# Patient Record
Sex: MALE | Race: WHITE | NOT HISPANIC OR LATINO | URBAN - METROPOLITAN AREA
[De-identification: names, ages, dates, MRNs, and addresses within clinical notes are randomized per-mention and may not be internally consistent; named-entity substitution may affect disease eponyms.]

---

## 2018-01-11 NOTE — RESULT NOTES
Verified Results  * MRI KNEE RIGHT  WO CONTRAST [id number] 01:24PM Kandi Wei Order Number: [id number]    - Patient Instructions: To schedule this appointment, please contact Central Scheduling at [phone]   Order Number: [id number]    - Patient Instructions: To schedule this appointment, please contact Central Scheduling at [phone]  Test Name Result Flag Reference   MRI KNEE RIGHT  WO CONTRAST (Report)     This is a summary report  The complete report is available in the patient's medical record  If you cannot access the medical record, please contact the sending organization for a detailed fax or copy  MRI RIGHT KNEE     INDICATION: Right knee pain  No meniscal injury  Internal derangement  Status post prior meniscal repair  COMPARISON: None  TECHNIQUE:  MR sequences were obtained of the right knee including: Localizer, axial T2 fat sat, coronal T1/T2 fat sat, sagittal PD/T2 fat sat  Images were acquired on a 1 5 Desi unit  Gadolinium was not used  FINDINGS:     SUBCUTANEOUS TISSUES: Normal     JOINT EFFUSION: Small effusion evident  BAKER'S CYST: None  MENISCI: Diminutive appearance to the posterior horn medial meniscus after previous partial meniscectomy  The dorsal root appears fatty-replaced  No evidence of residual or recurrent tear  The lateral meniscus remains intact  CRUCIATE LIGAMENTS: Intact  EXTENSOR APPARATUS: Intact  COLLATERAL LIGAMENTS: Intact  ARTICULAR SURFACES: Intact  BONE MARROW: Normal signal without fracture  MUSCULATURE: Intact  IMPRESSION:   1  Status post partial medial meniscectomy without residual or recurrent tear identified  2  Small joint effusion         Workstation performed: [id number]     Signed by:   Jareth Trujillo MD   9/22/16

## 2022-10-31 ENCOUNTER — VBI (OUTPATIENT)
Dept: ADMINISTRATIVE | Facility: OTHER | Age: 57
End: 2022-10-31

## 2023-02-21 ENCOUNTER — VBI (OUTPATIENT)
Dept: ADMINISTRATIVE | Facility: OTHER | Age: 58
End: 2023-02-21

## 2023-05-16 ENCOUNTER — VBI (OUTPATIENT)
Dept: ADMINISTRATIVE | Facility: OTHER | Age: 58
End: 2023-05-16

## 2025-02-06 ENCOUNTER — HOSPITAL ENCOUNTER (EMERGENCY)
Facility: HOSPITAL | Age: 60
Discharge: HOME | End: 2025-02-06
Attending: EMERGENCY MEDICINE
Payer: OTHER MISCELLANEOUS

## 2025-02-06 VITALS
SYSTOLIC BLOOD PRESSURE: 159 MMHG | RESPIRATION RATE: 18 BRPM | HEART RATE: 80 BPM | TEMPERATURE: 97.3 F | DIASTOLIC BLOOD PRESSURE: 98 MMHG | OXYGEN SATURATION: 95 %

## 2025-02-06 DIAGNOSIS — Z77.098 CHEMICAL EXPOSURE OF EYE: Primary | ICD-10-CM

## 2025-02-06 DIAGNOSIS — Z23 NEED FOR DIPHTHERIA-TETANUS-PERTUSSIS (TDAP) VACCINE: ICD-10-CM

## 2025-02-06 PROCEDURE — 90715 TDAP VACCINE 7 YRS/> IM: CPT | Performed by: PHYSICIAN ASSISTANT

## 2025-02-06 PROCEDURE — 99283 EMERGENCY DEPT VISIT LOW MDM: CPT

## 2025-02-06 PROCEDURE — 99284 EMERGENCY DEPT VISIT MOD MDM: CPT | Performed by: PHYSICIAN ASSISTANT

## 2025-02-06 PROCEDURE — 90471 IMMUNIZATION ADMIN: CPT

## 2025-02-06 RX ORDER — ERYTHROMYCIN 5 MG/G
0.5 OINTMENT OPHTHALMIC ONCE
Status: COMPLETED | OUTPATIENT
Start: 2025-02-06 | End: 2025-02-06

## 2025-02-06 RX ORDER — TETRACAINE HYDROCHLORIDE 5 MG/ML
1 SOLUTION OPHTHALMIC ONCE
Status: COMPLETED | OUTPATIENT
Start: 2025-02-06 | End: 2025-02-06

## 2025-02-06 RX ORDER — POLYVINYL ALCOHOL 14 MG/ML
1 SOLUTION/ DROPS OPHTHALMIC AS NEEDED
Qty: 15 ML | Refills: 0 | Status: SHIPPED | OUTPATIENT
Start: 2025-02-06

## 2025-02-06 RX ORDER — ERYTHROMYCIN 5 MG/G
OINTMENT OPHTHALMIC EVERY 6 HOURS SCHEDULED
Qty: 3.5 G | Refills: 0 | Status: SHIPPED | OUTPATIENT
Start: 2025-02-06

## 2025-02-06 RX ORDER — IBUPROFEN 600 MG/1
600 TABLET, FILM COATED ORAL ONCE
Status: COMPLETED | OUTPATIENT
Start: 2025-02-06 | End: 2025-02-06

## 2025-02-06 RX ADMIN — TETANUS TOXOID, REDUCED DIPHTHERIA TOXOID AND ACELLULAR PERTUSSIS VACCINE, ADSORBED 0.5 ML: 5; 2.5; 8; 8; 2.5 SUSPENSION INTRAMUSCULAR at 17:14

## 2025-02-06 RX ADMIN — FLUORESCEIN SODIUM 2 STRIP: 1 STRIP OPHTHALMIC at 16:38

## 2025-02-06 RX ADMIN — IBUPROFEN 600 MG: 600 TABLET, FILM COATED ORAL at 17:14

## 2025-02-06 RX ADMIN — ERYTHROMYCIN 0.5 INCH: 5 OINTMENT OPHTHALMIC at 17:15

## 2025-02-06 RX ADMIN — TETRACAINE HYDROCHLORIDE 1 DROP: 5 SOLUTION OPHTHALMIC at 16:39

## 2025-02-06 NOTE — Clinical Note
Carson Mathis was seen and treated in our emergency department on 2/6/2025.    Occupational Medicine Follow Up Within 24 hours    Other - See Comments        Diagnosis:     Carson  .    He may return on this date:     Please excuse from work until cleared by eye doctor.     If you have any questions or concerns, please don't hesitate to call.      Svetlana Livingston PA-C    ______________________________           _______________          _______________  Hospital Representative                              Date                                Time

## 2025-02-06 NOTE — ED PROVIDER NOTES
Time reflects when diagnosis was documented in both MDM as applicable and the Disposition within this note       Time User Action Codes Description Comment    2/6/2025  5:21 PM Svetlana Livingston Add [Z77.098] Chemical exposure of eye     2/6/2025  5:25 PM Svetlana Livingston Add [Z23] Need for diphtheria-tetanus-pertussis (Tdap) vaccine           ED Disposition       ED Disposition   Discharge    Condition   Stable    Date/Time   Thu Feb 6, 2025  5:21 PM    Comment   Carson Mathis discharge to home/self care.                   Assessment & Plan       Medical Decision Making  58 yo male presenting for evaluation of irritation to eyes after chemical exposure.  Prior records reviewed.  Safety material data sheet for the chemical reviewed.  Pt has already irrigated.  Will check pH of the eyes.  Will provide tetracaine drops and perform fluorescein stain.  Will update tetanus as pt is unsure of his last one.    pH was obtained from bilateral eyes and 7 bilaterally (neutral).  After tetracaine administered, fluorescein was performed bilaterally with no appreciated corneal abnormalities.  No noted abrasion or ulceration.  No FB.  Pt tolerated well.  Discussed with on call ophthalmology who gave recommendation for artificial tears and can see tomorrow morning in the office.    Reviewed symptomatic management.  OTC meds reviewed.  Anticipatory guidance.  Advised recheck with PCP or return to ER as needed.  Strict return precautions outlined.  Patient voiced understanding and had no further questions.    Please refer to above ER course for further details/discussion.      Problems Addressed:  Chemical exposure of eye: acute illness or injury  Need for diphtheria-tetanus-pertussis (Tdap) vaccine: self-limited or minor problem    Amount and/or Complexity of Data Reviewed  External Data Reviewed: notes.    Risk  OTC drugs.  Prescription drug management.        ED Course as of 02/06/25 1806   Thu Feb 06, 2025   1650 Spoke with  ophthalmology on call.  Recommends artificial tears and can see tomorrow morning at [facility].       Medications   tetracaine 0.5 % ophthalmic solution 1 drop (1 drop Both Eyes Given 2/6/25 1639)   fluorescein sodium sterile ophthalmic strip 2 strip (2 strips Both Eyes Given 2/6/25 1638)   tetanus-diphtheria-acellular pertussis (BOOSTRIX) IM injection 0.5 mL (0.5 mL Intramuscular Given 2/6/25 1714)   erythromycin (ILOTYCIN) 0.5 % ophthalmic ointment 0.5 inch (0.5 inches Both Eyes Given 2/6/25 1715)   ibuprofen (MOTRIN) tablet 600 mg (600 mg Oral Given 2/6/25 1714)       ED Risk Strat Scores                          SBIRT 20yo+      Flowsheet Row Most Recent Value   Initial Alcohol Screen: US AUDIT-C     1. How often do you have a drink containing alcohol? 0 Filed at: 02/06/2025 1626   2. How many drinks containing alcohol do you have on a typical day you are drinking?  0 Filed at: 02/06/2025 1626   3a. Male UNDER 65: How often do you have five or more drinks on one occasion? 0 Filed at: 02/06/2025 1626   3b. FEMALE Any Age, or MALE 65+: How often do you have 4 or more drinks on one occassion? 0 Filed at: 02/06/2025 1626   Audit-C Score 0 Filed at: 02/06/2025 1626   MIRANDA: How many times in the past year have you...    Used an illegal drug or used a prescription medication for non-medical reasons? Never Filed at: 02/06/2025 1626                            History of Present Illness       Chief Complaint   Patient presents with    Chemical Exposure     Patient was cleanign out vacuum system at work when maleic anhydride splashed in his eyes. Eyes were immediately rinsed/flushed with water. Patient is unable to open eyes and states it burns.        History reviewed. No pertinent past medical history.   History reviewed. No pertinent surgical history.   History reviewed. No pertinent family history.       E-Cigarette/Vaping      E-Cigarette/Vaping Substances      I have reviewed and agree with the history as  documented.     59 year old male with no significant reported PMH presenting ambulatory from work for evaluation of bilateral eye irritation.  This happened today at work ([employer]) prior to arrival.  Pt reports he was cleaning a vacuum system when the powdered chemical  maleic anhydride splashed over his glasses and into both his eyes.  He notes he had immediately burning and pain.  He proceeded to irrigate the eyes for at least 30 minutes.  He then presented for further evaluation.  He reports eye irritation and pain bilaterally.  He denies any other surface contact or ingestion.  He notes he had something similar happened about 2 years ago.  He wears glasses and had these on.  No contact lenses.  He reports increased pain when opening his eyes as he feels they are sensitive to light.  Pt has otherwise been in usual state of health.  Denies fever, chills, congestion, cough or recent illness.  Denies chest pain, SOB, N/V/D, abdominal pain.  No reported aggravating or alleviating factors.      History provided by:  Patient and medical records   used: No        Review of Systems   Constitutional: Negative.  Negative for chills, fatigue and fever.   HENT: Negative.  Negative for congestion, rhinorrhea and sore throat.    Eyes:  Positive for pain and redness. Negative for visual disturbance.   Respiratory: Negative.  Negative for cough, shortness of breath and wheezing.    Cardiovascular: Negative.  Negative for chest pain.   Gastrointestinal: Negative.  Negative for abdominal pain, diarrhea, nausea and vomiting.   Genitourinary: Negative.    Musculoskeletal: Negative.  Negative for back pain and neck pain.   Skin: Negative.  Negative for rash.   Neurological: Negative.  Negative for dizziness, light-headedness, numbness and headaches.   Psychiatric/Behavioral: Negative.     All other systems reviewed and are negative.          Objective       ED Triage Vitals [02/06/25 1625]   Temperature Pulse Blood  Pressure Respirations SpO2 Patient Position - Orthostatic VS   (!) 97.3 °F (36.3 °C) 80 159/98 18 95 % Lying      Temp Source Heart Rate Source BP Location FiO2 (%) Pain Score    Temporal Monitor Right arm -- No Pain      Vitals      Date and Time Temp Pulse SpO2 Resp BP Pain Score FACES Pain Rating User   02/06/25 1714 -- -- -- -- -- No Pain -- KS   02/06/25 1625 97.3 °F (36.3 °C) 80 95 % 18 159/98 No Pain -- CLS            Physical Exam  Vitals and nursing note reviewed.   Constitutional:       General: He is awake. He is not in acute distress.     Appearance: Normal appearance. He is well-developed. He is not toxic-appearing or diaphoretic.      Comments: Laying on exam litter, eyes closed.   HENT:      Head: Normocephalic and atraumatic.      Right Ear: Hearing, tympanic membrane, ear canal and external ear normal.      Left Ear: Hearing, tympanic membrane, ear canal and external ear normal.      Nose: Nose normal.      Mouth/Throat:      Mouth: Mucous membranes are moist.      Tongue: Tongue does not deviate from midline.      Pharynx: Oropharynx is clear. Uvula midline.   Eyes:      General: Lids are normal. No scleral icterus.     Extraocular Movements: Extraocular movements intact.      Conjunctiva/sclera:      Right eye: Right conjunctiva is injected.      Left eye: Left conjunctiva is injected.      Pupils: Pupils are equal, round, and reactive to light.      Comments: Tearing of the eyes bilaterally   Neck:      Trachea: Trachea and phonation normal.   Cardiovascular:      Rate and Rhythm: Normal rate and regular rhythm.      Pulses: Normal pulses.      Heart sounds: Normal heart sounds, S1 normal and S2 normal. No murmur heard.  Pulmonary:      Effort: Pulmonary effort is normal. No tachypnea or respiratory distress.      Breath sounds: Normal breath sounds. No wheezing, rhonchi or rales.   Abdominal:      General: Bowel sounds are normal. There is no distension.      Palpations: Abdomen is soft.       Tenderness: There is no abdominal tenderness. There is no guarding.   Musculoskeletal:      Cervical back: Normal range of motion and neck supple.      Right lower leg: No edema.      Left lower leg: No edema.   Skin:     General: Skin is warm and dry.      Capillary Refill: Capillary refill takes less than 2 seconds.      Findings: No rash.   Neurological:      General: No focal deficit present.      Mental Status: He is alert and oriented to person, place, and time.      GCS: GCS eye subscore is 4. GCS verbal subscore is 5. GCS motor subscore is 6.      Gait: Gait normal.   Psychiatric:         Mood and Affect: Mood normal.         Speech: Speech normal.         Behavior: Behavior normal. Behavior is cooperative.         Results Reviewed       None            No orders to display       Procedures    ED Medication and Procedure Management   None     Discharge Medication List as of 2/6/2025  5:27 PM        START taking these medications    Details   erythromycin (ILOTYCIN) ophthalmic ointment Administer to both eyes every 6 (six) hours Place a 1/2 inch ribbon of ointment into the lower eyelid., Starting Thu 2/6/2025, Normal      polyvinyl alcohol (LIQUIFILM TEARS) 1.4 % ophthalmic solution Administer 1 drop to both eyes as needed for dry eyes, Starting Thu 2/6/2025, Normal           No discharge procedures on file.  ED SEPSIS DOCUMENTATION   Time reflects when diagnosis was documented in both MDM as applicable and the Disposition within this note       Time User Action Codes Description Comment    2/6/2025  5:21 PM Svetlana Livingston [Z77.098] Chemical exposure of eye     2/6/2025  5:25 PM Svetlana Livingston [Z23] Need for diphtheria-tetanus-pertussis (Tdap) vaccine                  Svetlana Livingston PA-C  02/06/25 0627

## 2025-02-06 NOTE — DISCHARGE INSTRUCTIONS
House/Bipin will see you tomorrow at their [place] office.  Continue use of eye ointment and artifical tears.  OTC medications such as tylenol and ibuprofen for pain relief.    [address] [place] [address]  PHONE NUMBER: [phone]

## 2025-03-01 ENCOUNTER — OFFICE VISIT (OUTPATIENT)
Dept: URGENT CARE | Facility: MEDICAL CENTER | Age: 60
End: 2025-03-01
Payer: COMMERCIAL

## 2025-03-01 VITALS
TEMPERATURE: 97.4 F | OXYGEN SATURATION: 96 % | DIASTOLIC BLOOD PRESSURE: 79 MMHG | HEART RATE: 86 BPM | BODY MASS INDEX: 22.66 KG/M2 | WEIGHT: 153 LBS | HEIGHT: 69 IN | RESPIRATION RATE: 23 BRPM | SYSTOLIC BLOOD PRESSURE: 145 MMHG

## 2025-03-01 DIAGNOSIS — R06.2 WHEEZING: Primary | ICD-10-CM

## 2025-03-01 DIAGNOSIS — R05.1 ACUTE COUGH: ICD-10-CM

## 2025-03-01 PROCEDURE — 94640 AIRWAY INHALATION TREATMENT: CPT | Performed by: PHYSICIAN ASSISTANT

## 2025-03-01 PROCEDURE — S9083 URGENT CARE CENTER GLOBAL: HCPCS | Performed by: PHYSICIAN ASSISTANT

## 2025-03-01 PROCEDURE — G0381 LEV 2 HOSP TYPE B ED VISIT: HCPCS | Performed by: PHYSICIAN ASSISTANT

## 2025-03-01 RX ORDER — ALBUTEROL SULFATE 90 UG/1
2 INHALANT RESPIRATORY (INHALATION) EVERY 4 HOURS PRN
Qty: 18 G | Refills: 0 | Status: SHIPPED | OUTPATIENT
Start: 2025-03-01 | End: 2025-03-31

## 2025-03-01 RX ORDER — BENZONATATE 200 MG/1
200 CAPSULE ORAL 3 TIMES DAILY PRN
Qty: 20 CAPSULE | Refills: 0 | Status: SHIPPED | OUTPATIENT
Start: 2025-03-01

## 2025-03-01 RX ORDER — IPRATROPIUM BROMIDE AND ALBUTEROL SULFATE 2.5; .5 MG/3ML; MG/3ML
3 SOLUTION RESPIRATORY (INHALATION) ONCE
Status: COMPLETED | OUTPATIENT
Start: 2025-03-01 | End: 2025-03-01

## 2025-03-01 RX ADMIN — IPRATROPIUM BROMIDE AND ALBUTEROL SULFATE 3 ML: 2.5; .5 SOLUTION RESPIRATORY (INHALATION) at 12:37

## 2025-03-01 NOTE — PROGRESS NOTES
[facility]        NAME: Carson Mathis is a 59 y.o. male  : 1965    MRN: [id number]  DATE: 2025  TIME: 1:36 PM    Assessment and Plan   Wheezing [R06.2]  1. Wheezing  ipratropium-albuterol (DUO-NEB) 0.5-2.5 mg/3 mL inhalation solution 3 mL    Mini neb      2. Acute cough  albuterol (PROVENTIL HFA,VENTOLIN HFA) 90 mcg/act inhaler    benzonatate (TESSALON) 200 MG capsule            Patient Instructions     Use Albuterol inhaler every 4 hrs as needed for cough  Take tessalon for cough not improved by Albuterol  If symptoms fail to improve have yourself rechecked    Follow up with PCP in 3-5 days.  Proceed to  ER if symptoms worsen.    If tests have been performed at [facility], our office will contact you with results if changes need to be made to the care plan discussed with you at the visit.  [facility]    Chief Complaint     Chief Complaint   Patient presents with   • Cough     Cough x 1 week          History of Present Illness       Patient presents with a 1 week history of a cough.  He states cough is worse when he is active.  It can be intermittently productive.  Patient denies fever, chills or cold-like symptoms.  No history of asthma or COPD.        Review of Systems   Review of Systems   Constitutional:  Negative for chills and fever.   HENT:  Negative for congestion, rhinorrhea and sore throat.    Respiratory:  Positive for cough.    Gastrointestinal:  Negative for diarrhea, nausea and vomiting.         Current Medications       Current Outpatient Medications:   •  albuterol (PROVENTIL HFA,VENTOLIN HFA) 90 mcg/act inhaler, Inhale 2 puffs every 4 (four) hours as needed for wheezing or shortness of breath, Disp: 18 g, Rfl: 0  •  benzonatate (TESSALON) 200 MG capsule, Take 1 capsule (200 mg total) by mouth 3 (three) times a day as needed for cough, Disp: 20 capsule, Rfl: 0  •  erythromycin (ILOTYCIN) ophthalmic ointment, Administer to both  "eyes every 6 (six) hours Place a 1/2 inch ribbon of ointment into the lower eyelid. (Patient not taking: Reported on 3/1/2025), Disp: 3.5 g, Rfl: 0  •  polyvinyl alcohol (LIQUIFILM TEARS) 1.4 % ophthalmic solution, Administer 1 drop to both eyes as needed for dry eyes (Patient not taking: Reported on 3/1/2025), Disp: 15 mL, Rfl: 0  No current facility-administered medications for this visit.    Current Allergies     Allergies as of 03/01/2025 - Reviewed 03/01/2025   Allergen Reaction Noted   • Propoxyphene Hives 04/16/2013            The following portions of the patient's history were reviewed and updated as appropriate: allergies, current medications, past family history, past medical history, past social history, past surgical history and problem list.     History reviewed. No pertinent past medical history.    History reviewed. No pertinent surgical history.    History reviewed. No pertinent family history.      Medications have been verified.        Objective   /79 (BP Location: Right arm, Patient Position: Sitting, Cuff Size: Adult)   Pulse 86   Temp (!) 97.4 °F (36.3 °C)   Resp (!) 23   Ht 5' 9\" (1.753 m)   Wt 69.4 kg (153 lb)   SpO2 96%   BMI 22.59 kg/m²   No LMP for male patient.       Physical Exam     Physical Exam  Vitals and nursing note reviewed.   Constitutional:       Appearance: Normal appearance.   HENT:      Head: Normocephalic and atraumatic.      Mouth/Throat:      Mouth: Mucous membranes are moist.      Pharynx: Oropharynx is clear.   Cardiovascular:      Rate and Rhythm: Normal rate and regular rhythm.      Heart sounds: Normal heart sounds.   Pulmonary:      Effort: Pulmonary effort is normal.      Comments: Diminished breath sounds in all lung fields with end expiratory wheezing.  Musculoskeletal:      Cervical back: Neck supple.   Lymphadenopathy:      Cervical: No cervical adenopathy.   Skin:     General: Skin is warm.   Neurological:      Mental Status: He is alert.     Mini " neb    Performed by: Sirena Bray PA-C  Authorized by: Sirena Bray PA-C  Universal Protocol:  procedure performed by consultantConsent: Verbal consent obtained.  Risks and benefits: risks, benefits and alternatives were discussed  Consent given by: patient  Patient understanding: patient states understanding of the procedure being performed  Patient identity confirmed: verbally with patient    Number of treatments:  1  Treatment 1:   Pre-Procedure     Symptoms:  Wheezing and cough    Lung Sounds:  Diminshed with end expiratory wheezing    HR:  86    RR:  23    SP02:  96%    Medication Administered:  Duoneb - Albuterol 2.5 mg/Atrovent 0.5 mg  Post-Procedure     Symptoms:  Cough    Lung sounds:  Clear    HR:  82    RR:  18    SP02:  99%

## 2025-03-01 NOTE — PATIENT INSTRUCTIONS
Use Albuterol inhaler every 4 hrs as needed for cough  Take tessalon for cough not improved by Albuterol  If symptoms fail to improve have yourself rechecked

## 2025-06-05 ENCOUNTER — APPOINTMENT (OUTPATIENT)
Dept: RADIOLOGY | Facility: MEDICAL CENTER | Age: 60
End: 2025-06-05
Attending: PHYSICIAN ASSISTANT
Payer: COMMERCIAL

## 2025-06-05 ENCOUNTER — OFFICE VISIT (OUTPATIENT)
Dept: URGENT CARE | Facility: MEDICAL CENTER | Age: 60
End: 2025-06-05
Payer: COMMERCIAL

## 2025-06-05 VITALS
RESPIRATION RATE: 18 BRPM | TEMPERATURE: 97.2 F | DIASTOLIC BLOOD PRESSURE: 94 MMHG | WEIGHT: 160 LBS | OXYGEN SATURATION: 99 % | BODY MASS INDEX: 23.63 KG/M2 | SYSTOLIC BLOOD PRESSURE: 138 MMHG | HEART RATE: 73 BPM

## 2025-06-05 DIAGNOSIS — R05.1 ACUTE COUGH: ICD-10-CM

## 2025-06-05 DIAGNOSIS — R05.1 ACUTE COUGH: Primary | ICD-10-CM

## 2025-06-05 PROCEDURE — G0382 LEV 3 HOSP TYPE B ED VISIT: HCPCS | Performed by: PHYSICIAN ASSISTANT

## 2025-06-05 PROCEDURE — S9083 URGENT CARE CENTER GLOBAL: HCPCS | Performed by: PHYSICIAN ASSISTANT

## 2025-06-05 PROCEDURE — 71046 X-RAY EXAM CHEST 2 VIEWS: CPT

## 2025-06-05 RX ORDER — ALBUTEROL SULFATE 90 UG/1
2 INHALANT RESPIRATORY (INHALATION) EVERY 4 HOURS PRN
Qty: 18 G | Refills: 0 | Status: SHIPPED | OUTPATIENT
Start: 2025-06-05 | End: 2025-06-06 | Stop reason: SDUPTHER

## 2025-06-05 NOTE — PROGRESS NOTES
[facility]        NAME: Carson Mathis is a 60 y.o. male  : 1965    MRN: [id number]  DATE: 2025  TIME: 1:47 PM    Assessment and Plan   Acute cough [R05.1]  1. Acute cough  XR chest pa and lateral        Symptoms most likely due to COPD. Advised ot follow up with PCP.    Patient Instructions     Follow up with PCP  Use Albuterol every 4-6 hrs as needed for shortness of breath of cough    Follow up with PCP in 3-5 days.  Proceed to  ER if symptoms worsen.    If tests have been performed at [facility], our office will contact you with results if changes need to be made to the care plan discussed with you at the visit.  You can review your full results on [facility].    Chief Complaint     Chief Complaint   Patient presents with    Cough     Patient exposed to peroxide and maleic acid at work, has a cough, feels he might need albuterol to clear it up.         History of Present Illness       Patient who has been smoking for the past 47 years of his life presents with a cough.  Cough is dry and worse at night when he lays down.  He was seen for the same symptoms in March when he was given an albuterol inhaler.  Patient states the symptoms improved with use of albuterol.  He has been using the albuterol roughly 3 times daily.  Patient states he has been working in his current employer for the past 4 years where he is exposed to melanic acid and peroxide.  He wears a respirator while working.  Patient has not had a chest x-ray in quite some time.        Review of Systems   Review of Systems   Constitutional:  Negative for chills and fever.   Respiratory:  Positive for cough and shortness of breath.          Current Medications     Current Medications[1]    Current Allergies     Allergies as of 2025 - Reviewed 2025   Allergen Reaction Noted    Propoxyphene Hives 2013            The following portions of the patient's history were reviewed and updated as appropriate:  allergies, current medications, past family history, past medical history, past social history, past surgical history and problem list.     Past Medical History[2]    Past Surgical History[3]    Family History[4]      Medications have been verified.        Objective   /94   Pulse 73   Temp (!) 97.2 °F (36.2 °C)   Resp 18   Wt 72.6 kg (160 lb)   SpO2 99%   BMI 23.63 kg/m²   No LMP for male patient.       Physical Exam     Physical Exam  Vitals and nursing note reviewed.   Constitutional:       Appearance: Normal appearance.   HENT:      Head: Normocephalic and atraumatic.     Cardiovascular:      Rate and Rhythm: Normal rate and regular rhythm.   Pulmonary:      Effort: Pulmonary effort is normal.      Comments: Diminished breath sounds in all lung fields    Neurological:      Mental Status: He is alert.     Xray independently reviewed by me contemporaneously as no acute osseous abnormality or acute process. Awaiting radiology interpretation.                     [1]   Current Outpatient Medications:     benzonatate (TESSALON) 200 MG capsule, Take 1 capsule (200 mg total) by mouth 3 (three) times a day as needed for cough (Patient not taking: Reported on 6/5/2025), Disp: 20 capsule, Rfl: 0    erythromycin (ILOTYCIN) ophthalmic ointment, Administer to both eyes every 6 (six) hours Place a 1/2 inch ribbon of ointment into the lower eyelid. (Patient not taking: Reported on 6/5/2025), Disp: 3.5 g, Rfl: 0    polyvinyl alcohol (LIQUIFILM TEARS) 1.4 % ophthalmic solution, Administer 1 drop to both eyes as needed for dry eyes (Patient not taking: Reported on 6/5/2025), Disp: 15 mL, Rfl: 0  [2] No past medical history on file.  [3] No past surgical history on file.  [4]   Family History  Problem Relation Name Age of Onset    No Known Problems Mother      No Known Problems Father

## 2025-06-05 NOTE — LETTER
June 5, 2025     Patient: Carson Mathis   YOB: 1965   Date of Visit: 6/5/2025       To Whom It May Concern:    It is my medical opinion that Carson Mathis can return to work 06/06/25.    If you have any questions or concerns, please don't hesitate to call.         Sincerely,        Sirena Bray PA-C    CC: No Recipients

## 2025-06-05 NOTE — PATIENT INSTRUCTIONS
Follow up with PCP  Use Albuterol every 4-6 hrs as needed for shortness of breath of cough    If tests have been performed at [facility], our office will contact you with results if changes need to be made to the care plan discussed with you at the visit.  You can review your full results on [facility] MyChart    
This is a surgical and/or non-medical patient.

## 2025-06-06 ENCOUNTER — RESULTS FOLLOW-UP (OUTPATIENT)
Dept: URGENT CARE | Facility: MEDICAL CENTER | Age: 60
End: 2025-06-06

## 2025-06-06 ENCOUNTER — OFFICE VISIT (OUTPATIENT)
Dept: FAMILY MEDICINE CLINIC | Facility: CLINIC | Age: 60
End: 2025-06-06
Payer: COMMERCIAL

## 2025-06-06 VITALS
OXYGEN SATURATION: 96 % | HEART RATE: 73 BPM | TEMPERATURE: 98.4 F | WEIGHT: 149.4 LBS | SYSTOLIC BLOOD PRESSURE: 122 MMHG | DIASTOLIC BLOOD PRESSURE: 78 MMHG | BODY MASS INDEX: 22.13 KG/M2 | HEIGHT: 69 IN

## 2025-06-06 DIAGNOSIS — R05.1 ACUTE COUGH: Primary | ICD-10-CM

## 2025-06-06 DIAGNOSIS — J01.00 ACUTE MAXILLARY SINUSITIS, RECURRENCE NOT SPECIFIED: ICD-10-CM

## 2025-06-06 PROCEDURE — 99204 OFFICE O/P NEW MOD 45 MIN: CPT | Performed by: FAMILY MEDICINE

## 2025-06-06 RX ORDER — ALBUTEROL SULFATE 90 UG/1
2 INHALANT RESPIRATORY (INHALATION) EVERY 4 HOURS PRN
Qty: 18 G | Refills: 0 | Status: SHIPPED | OUTPATIENT
Start: 2025-06-06 | End: 2025-07-06

## 2025-06-06 RX ORDER — AZITHROMYCIN 250 MG/1
TABLET, FILM COATED ORAL
Qty: 6 TABLET | Refills: 0 | Status: SHIPPED | OUTPATIENT
Start: 2025-06-06 | End: 2025-06-11

## 2025-06-06 RX ORDER — DEXTROMETHORPHAN HYDROBROMIDE AND PROMETHAZINE HYDROCHLORIDE 15; 6.25 MG/5ML; MG/5ML
5 SYRUP ORAL 4 TIMES DAILY PRN
Qty: 180 ML | Refills: 0 | Status: SHIPPED | OUTPATIENT
Start: 2025-06-06

## 2025-06-06 RX ORDER — METHYLPREDNISOLONE 4 MG/1
TABLET ORAL
Qty: 21 EACH | Refills: 0 | Status: SHIPPED | OUTPATIENT
Start: 2025-06-06

## 2025-06-06 NOTE — PROGRESS NOTES
Name: Carson Mathis      : 1965      MRN: [id number]  Encounter Provider: Martin Cohen DO  Encounter Date: 2025   Encounter department: [facility]  :  Assessment & Plan  Acute cough  We will provide a Medrol Dosepak Zithromax Z-MONIQUE and Promethazine DM.  Will reorder albuterol 2 puffs every 4 hours as needed.  We reviewed chest x-ray negative findings.  Reviewed urgent care care now of yesterday  Orders:  •  albuterol (PROVENTIL HFA,VENTOLIN HFA) 90 mcg/act inhaler; Inhale 2 puffs every 4 (four) hours as needed for wheezing or shortness of breath  •  methylPREDNISolone 4 MG tablet therapy pack; Use as directed on package  •  azithromycin (Zithromax) 250 mg tablet; Take 2 tablets (500 mg total) by mouth daily for 1 day, THEN 1 tablet (250 mg total) daily for 4 days.  •  promethazine-dextromethorphan (PHENERGAN-DM) 6.25-15 mg/5 mL oral syrup; Take 5 mL by mouth 4 (four) times a day as needed for cough    Acute maxillary sinusitis, recurrence not specified    Orders:  •  methylPREDNISolone 4 MG tablet therapy pack; Use as directed on package  •  azithromycin (Zithromax) 250 mg tablet; Take 2 tablets (500 mg total) by mouth daily for 1 day, THEN 1 tablet (250 mg total) daily for 4 days.  •  promethazine-dextromethorphan (PHENERGAN-DM) 6.25-15 mg/5 mL oral syrup; Take 5 mL by mouth 4 (four) times a day as needed for cough           History of Present Illness   Patient presents with cold-like symptoms with sinus congestion nasal drainage and cough      Review of Systems   Constitutional:  Negative for chills and fever.   HENT:  Positive for congestion, postnasal drip, rhinorrhea and sinus pressure. Negative for ear pain and sore throat.    Eyes:  Negative for pain and visual disturbance.   Respiratory:  Positive for cough. Negative for shortness of breath.    Cardiovascular:  Negative for chest pain and palpitations.   Gastrointestinal:  Negative for abdominal pain and  "vomiting.   Genitourinary:  Negative for dysuria and hematuria.   Musculoskeletal:  Negative for arthralgias and back pain.   Skin:  Negative for color change and rash.   Neurological:  Negative for seizures and syncope.   All other systems reviewed and are negative.      Objective   /78 (BP Location: Left arm, Patient Position: Sitting)   Pulse 73   Temp 98.4 °F (36.9 °C) (Tympanic)   Ht 5' 9\" (1.753 m)   Wt 67.8 kg (149 lb 6.4 oz)   SpO2 96%   BMI 22.06 kg/m²      Physical Exam  Constitutional:       Appearance: Normal appearance.   HENT:      Head: Normocephalic and atraumatic.      Right Ear: Tympanic membrane, ear canal and external ear normal.      Left Ear: Tympanic membrane, ear canal and external ear normal.      Nose: Congestion and rhinorrhea present.      Right Sinus: Maxillary sinus tenderness present.      Left Sinus: Maxillary sinus tenderness present.      Mouth/Throat:      Mouth: Mucous membranes are moist.      Pharynx: Oropharynx is clear.     Eyes:      Extraocular Movements: Extraocular movements intact.      Conjunctiva/sclera: Conjunctivae normal.      Pupils: Pupils are equal, round, and reactive to light.       Cardiovascular:      Rate and Rhythm: Normal rate and regular rhythm.      Pulses: Normal pulses.      Heart sounds: Normal heart sounds.   Pulmonary:      Effort: Pulmonary effort is normal.      Breath sounds: Normal breath sounds.   Abdominal:      General: Abdomen is flat. Bowel sounds are normal.      Palpations: Abdomen is soft.     Musculoskeletal:         General: Normal range of motion.      Cervical back: Normal range of motion.     Skin:     General: Skin is warm and dry.      Capillary Refill: Capillary refill takes less than 2 seconds.     Neurological:      General: No focal deficit present.      Mental Status: He is alert and oriented to person, place, and time.     Psychiatric:         Mood and Affect: Mood normal.         Behavior: Behavior normal. "